# Patient Record
Sex: FEMALE | Race: WHITE
[De-identification: names, ages, dates, MRNs, and addresses within clinical notes are randomized per-mention and may not be internally consistent; named-entity substitution may affect disease eponyms.]

---

## 2021-08-17 ENCOUNTER — HOSPITAL ENCOUNTER (EMERGENCY)
Dept: HOSPITAL 46 - ED | Age: 81
Discharge: HOME | End: 2021-08-17
Payer: MEDICARE

## 2021-08-17 VITALS — WEIGHT: 130.01 LBS | HEIGHT: 68 IN | BODY MASS INDEX: 19.7 KG/M2

## 2021-08-17 DIAGNOSIS — I10: ICD-10-CM

## 2021-08-17 DIAGNOSIS — Z88.8: ICD-10-CM

## 2021-08-17 DIAGNOSIS — E11.9: ICD-10-CM

## 2021-08-17 DIAGNOSIS — T46.4X5A: ICD-10-CM

## 2021-08-17 DIAGNOSIS — Z79.899: ICD-10-CM

## 2021-08-17 DIAGNOSIS — T78.3XXA: Primary | ICD-10-CM

## 2021-08-17 DIAGNOSIS — E03.9: ICD-10-CM

## 2021-12-23 ENCOUNTER — HOSPITAL ENCOUNTER (EMERGENCY)
Dept: HOSPITAL 46 - ED | Age: 81
Discharge: HOME | End: 2021-12-23
Payer: MEDICARE

## 2021-12-23 VITALS — WEIGHT: 161 LBS | HEIGHT: 68 IN | BODY MASS INDEX: 24.4 KG/M2

## 2021-12-23 DIAGNOSIS — I10: ICD-10-CM

## 2021-12-23 DIAGNOSIS — R74.8: ICD-10-CM

## 2021-12-23 DIAGNOSIS — E03.9: ICD-10-CM

## 2021-12-23 DIAGNOSIS — K59.00: Primary | ICD-10-CM

## 2021-12-23 DIAGNOSIS — Z79.84: ICD-10-CM

## 2021-12-23 DIAGNOSIS — Z79.899: ICD-10-CM

## 2021-12-23 DIAGNOSIS — E11.9: ICD-10-CM

## 2021-12-23 DIAGNOSIS — Z88.8: ICD-10-CM

## 2023-05-24 ENCOUNTER — HOSPITAL ENCOUNTER (EMERGENCY)
Dept: HOSPITAL 46 - ED | Age: 83
Discharge: HOME | End: 2023-05-24
Payer: MEDICARE

## 2023-05-24 VITALS — WEIGHT: 161 LBS | BODY MASS INDEX: 24.4 KG/M2 | HEIGHT: 68 IN

## 2023-05-24 VITALS — DIASTOLIC BLOOD PRESSURE: 94 MMHG | SYSTOLIC BLOOD PRESSURE: 114 MMHG

## 2023-05-24 DIAGNOSIS — Z79.890: ICD-10-CM

## 2023-05-24 DIAGNOSIS — Z79.899: ICD-10-CM

## 2023-05-24 DIAGNOSIS — I10: ICD-10-CM

## 2023-05-24 DIAGNOSIS — E11.9: ICD-10-CM

## 2023-05-24 DIAGNOSIS — M48.54XA: Primary | ICD-10-CM

## 2023-05-24 DIAGNOSIS — E03.9: ICD-10-CM

## 2023-05-24 DIAGNOSIS — Z88.8: ICD-10-CM

## 2023-05-24 PROCEDURE — A9270 NON-COVERED ITEM OR SERVICE: HCPCS

## 2023-09-13 ENCOUNTER — HOSPITAL ENCOUNTER (OUTPATIENT)
Dept: HOSPITAL 46 - ED | Age: 83
Setting detail: OBSERVATION
LOS: 1 days | Discharge: HOME | End: 2023-09-14
Attending: STUDENT IN AN ORGANIZED HEALTH CARE EDUCATION/TRAINING PROGRAM | Admitting: STUDENT IN AN ORGANIZED HEALTH CARE EDUCATION/TRAINING PROGRAM
Payer: MEDICARE

## 2023-09-13 VITALS — SYSTOLIC BLOOD PRESSURE: 141 MMHG | DIASTOLIC BLOOD PRESSURE: 52 MMHG

## 2023-09-13 VITALS — DIASTOLIC BLOOD PRESSURE: 56 MMHG | SYSTOLIC BLOOD PRESSURE: 123 MMHG

## 2023-09-13 VITALS — SYSTOLIC BLOOD PRESSURE: 117 MMHG | DIASTOLIC BLOOD PRESSURE: 44 MMHG

## 2023-09-13 VITALS — DIASTOLIC BLOOD PRESSURE: 54 MMHG | SYSTOLIC BLOOD PRESSURE: 132 MMHG

## 2023-09-13 VITALS — DIASTOLIC BLOOD PRESSURE: 52 MMHG | SYSTOLIC BLOOD PRESSURE: 129 MMHG

## 2023-09-13 VITALS — WEIGHT: 155.43 LBS | BODY MASS INDEX: 23.56 KG/M2 | HEIGHT: 68 IN

## 2023-09-13 DIAGNOSIS — Z79.899: ICD-10-CM

## 2023-09-13 DIAGNOSIS — Z20.822: ICD-10-CM

## 2023-09-13 DIAGNOSIS — E11.9: ICD-10-CM

## 2023-09-13 DIAGNOSIS — E86.0: ICD-10-CM

## 2023-09-13 DIAGNOSIS — Z79.890: ICD-10-CM

## 2023-09-13 DIAGNOSIS — K75.3: ICD-10-CM

## 2023-09-13 DIAGNOSIS — E03.9: ICD-10-CM

## 2023-09-13 DIAGNOSIS — Z79.84: ICD-10-CM

## 2023-09-13 DIAGNOSIS — A41.9: Primary | ICD-10-CM

## 2023-09-13 DIAGNOSIS — Z88.8: ICD-10-CM

## 2023-09-13 DIAGNOSIS — I10: ICD-10-CM

## 2023-09-13 DIAGNOSIS — K52.9: ICD-10-CM

## 2023-09-13 DIAGNOSIS — R74.01: ICD-10-CM

## 2023-09-13 LAB
ALBUMIN SERPL-MCNC: 2.8 G/DL (ref 3.4–5)
ALBUMIN SERPL-MCNC: 3.6 G/DL (ref 3.4–5)
ALBUMIN/GLOB SERPL: 0.65 {RATIO} (ref 1.1–2.4)
ALBUMIN/GLOB SERPL: 0.71 {RATIO} (ref 1.1–2.4)
ALP SERPL-CCNC: 144 U/L (ref 46–116)
ALP SERPL-CCNC: 212 U/L (ref 46–116)
ALT SERPL W P-5'-P-CCNC: 376 U/L (ref 14–59)
ALT SERPL W P-5'-P-CCNC: 503 U/L (ref 14–59)
ANION GAP SERPL CALCULATED.4IONS-SCNC: 12.4 MMOL/L (ref 7–21)
ANION GAP SERPL CALCULATED.4IONS-SCNC: 18.9 MMOL/L (ref 7–21)
AST SERPL-CCNC: 412 U/L (ref 15–37)
AST SERPL-CCNC: 736 U/L (ref 15–37)
BASOPHILS NFR BLD AUTO: 0.1 % (ref 0–2)
BASOPHILS NFR BLD AUTO: 0.1 % (ref 0–2)
BUN SERPL-MCNC: 18 MG/DL (ref 7–18)
BUN SERPL-MCNC: 19 MG/DL (ref 7–18)
BUN/CREAT SERPL: 11.18 (ref 6–28.6)
BUN/CREAT SERPL: 13.57 (ref 6–28.6)
CALCIUM SERPL-MCNC: 10 MG/DL (ref 8.5–10.1)
CALCIUM SERPL-MCNC: 8.4 MG/DL (ref 8.5–10.1)
CHLORIDE SERPL-SCNC: 100 MMOL/L (ref 98–107)
CHLORIDE SERPL-SCNC: 95 MMOL/L (ref 98–107)
CO2 SERPL-SCNC: 23 MMOL/L (ref 21–32)
CO2 SERPL-SCNC: 26 MMOL/L (ref 21–32)
COCAINE, UR: NEGATIVE
DEPRECATED RDW RBC AUTO: 15.1 FL (ref 10.5–15)
DEPRECATED RDW RBC AUTO: 15.5 FL (ref 10.5–15)
EGFRCR SERPLBLD CKD-EPI 2021: 32 ML/MIN (ref 60–?)
EGFRCR SERPLBLD CKD-EPI 2021: 38 ML/MIN (ref 60–?)
EOSINOPHIL NFR BLD AUTO: 0.1 % (ref 0–6)
EOSINOPHIL NFR BLD AUTO: 0.2 % (ref 0–6)
FLUBV RNA RESP QL NAA+PROBE: NEGATIVE
GLOBULIN SER-MCNC: 4.3 G/DL (ref 1.8–3.5)
GLOBULIN SER-MCNC: 5.1 G/DL (ref 1.8–3.5)
HCT VFR BLD AUTO: 32.5 % (ref 35–50)
HCT VFR BLD AUTO: 42.4 % (ref 35–50)
HGB BLD-MCNC: 10.3 G/DL (ref 12–18)
HGB BLD-MCNC: 13.6 G/DL (ref 12–18)
HGB UR QL STRIP: NEGATIVE
KETONES UR QL STRIP: NEGATIVE
LACTATE SERPL-SCNC: 5 MMOL/L (ref 0.4–2)
LEUKOCYTE ESTERASE UR QL STRIP: NEGATIVE
LYMPHOCYTES NFR BLD AUTO: 7.4 % (ref 24–44)
LYMPHOCYTES NFR BLD AUTO: 9.9 % (ref 24–44)
MCH RBC QN AUTO: 28.3 PG (ref 27–36)
MCH RBC QN AUTO: 28.4 PG (ref 27–36)
MCHC RBC AUTO-ENTMCNC: 31.7 G/DL (ref 30–36)
MCHC RBC AUTO-ENTMCNC: 32.1 G/DL (ref 30–36)
MCV RBC AUTO: 88.3 FL (ref 81–99)
MCV RBC AUTO: 89.3 FL (ref 81–99)
METHADONE, UR: NEGATIVE
MONOCYTES NFR BLD AUTO: 2.3 % (ref 0–12)
MONOCYTES NFR BLD AUTO: 4.2 % (ref 0–12)
NEUTROPHILS NFR BLD AUTO: 85.7 % (ref 39–80)
NEUTROPHILS NFR BLD AUTO: 90 % (ref 39–80)
NITRITE UR QL STRIP: NEGATIVE
PLATELET # BLD AUTO: 205 K/UL (ref 140–440)
PLATELET # BLD AUTO: 233 K/UL (ref 140–440)
POTASSIUM SERPL-SCNC: 3.9 MMOL/L (ref 3.5–5.1)
POTASSIUM SERPL-SCNC: 4.4 MMOL/L (ref 3.5–5.1)
PROT SERPL-MCNC: 7.1 G/DL (ref 6.4–8.2)
PROT SERPL-MCNC: 8.7 G/DL (ref 6.4–8.2)
RBC # BLD AUTO: 3.64 M/UL (ref 4.3–5.7)
RBC # BLD AUTO: 4.8 M/UL (ref 4.3–5.7)
RSV RNA ISLT QL NAA+PROBE: NEGATIVE

## 2023-09-13 PROCEDURE — C9803 HOPD COVID-19 SPEC COLLECT: HCPCS

## 2023-09-13 PROCEDURE — G0008 ADMIN INFLUENZA VIRUS VAC: HCPCS

## 2023-09-13 PROCEDURE — G0378 HOSPITAL OBSERVATION PER HR: HCPCS

## 2023-09-13 PROCEDURE — U0002 COVID-19 LAB TEST NON-CDC: HCPCS

## 2023-09-13 PROCEDURE — A9270 NON-COVERED ITEM OR SERVICE: HCPCS

## 2023-09-13 NOTE — NUR
RECIEVED SHIFT REPORT. PT RESTING IN BED, EYES CLOSED. BREATHING EVEN AND
UNLABORED. CALL LIGHT IN REACH.

## 2023-09-13 NOTE — NUR
PT HAS DENIED PAIN AND NAUSEA TODAY. TOLERATED DIET WELL. SLEEPS INBETWEEN
MEALS. SON VISITED. IV CONTINUES TO INFUSE LR, SITE INTACT, NO REDNESS OR
SWELL. PT AMBULATES IN ROOM WITH SUPERVISION AND ASSIST WITH LINES. TOLERATES
ACTIVITY WELL. IV IN RAC REMAINS SL. PT SITTING UP EATING MEAL, REQUESTS
SOMETHING FOR "HEART BURN." MAALOX ORDERED VIA NIO PROTOCOL.

## 2023-09-13 NOTE — NUR
Report recdieved by tonja NÚÑEZ @ 1930. Ronit is resting in bed without
complaint, watching TV, call light in reach.

## 2023-09-13 NOTE — NUR
PT STATES SHE DOESNT FEEL THE NEED TO URINATE. MYCHAL SMITH BLADDER SCANNED PT,
514 ML NOTED. PT WALKED TO THE BATHROOM, SBA. LUIS IN ROOM TO ASSIST.

## 2023-09-13 NOTE — NUR
PT UNABLE TO VOID FOR URINE SAMPLE, DENIES URGE TO GO. BLADDER SCAN PERFORMED.
514 MLS NOTED IN BLADDER. PT ASSISTED TO TOILET TO ATTEMPT TO VOID, PT UNABLE
TO VOID. DR. OLSON CALLED AND NOTIFIED. DESIRED LAB ADDED ON TO URINE SAMPLE
ALREADY IN LAB FORM ED. NO NEW ORDERS AT THIS TIME.

## 2023-09-13 NOTE — NUR
IN ROOM W RN. PT UP TO CHAIR. SBA W MINIMAL ASSIST. PT SAT UP FOR MEAL. LINENS
CHANGED; NO NEEDS. PT DECLINED RESTROOM NEEDS. CALL LIGHT WITHIN REACH.

## 2023-09-13 NOTE — NUR
VERBAL ORDER FROM MD TO CHANGE PROTONIX DUPLICATE ORDER
TO BID, REPEATED BACK FOR VERIFICATION. EMAR UPDATED.

## 2023-09-13 NOTE — NUR
ADMISSION PROCESS COMPLETE, PT REQUESTED AND RECEIVED ICE TO UPPER BACK, WITH
SHEET OVER SKIN, THEN ICE VAHE.  PT ABLE TO SELF TURN FROM BACK TO RIGHT SIDE.
HAS NO NEEDS AT THIS TIME.  NS INFUSING PER ED ORDER, ONCE LITER COMPLETE WILL
CHANGE TO LR.  CALL LIGHT WITHIN REACH.  SON MARY ELLEN WENT HOME FOR THE NIGHT, THEY
LIVE TOGETHER IN AN APARTMENT.  ALL PERSONAL BELONGING SENT HOME WITH SON, HE
WILL BRING IN CLEAN CLOTHES FOR DISCHARGE.

## 2023-09-13 NOTE — NUR
ER RN SHONNA CALLED, THIS RN ASKED ABOUT REPEAT LACTIC AS LACTIC 5.0, LAB TO
BE DONE, WILL AWAIT TIL THOSE RESULTS ARE BACK TO ENSURE THEY ARE TRENDING
DOWN.

## 2023-09-13 NOTE — NUR
PATIENT AWAKE IN BED, VITALS AND I&OS CHARTED. BLADDER SCANNED PER RN
REQUEST, 514ML CHARTED. PATIENT INTO BR WITH SBA, NO VOID AFTER SEVERAL
MINUTES. RN AWARE. PATIENT STATES SHE DRINKS ONLY COFFE AT HOME AND VERY
LITTLE WATER. FRESH ICE WATER WITH SF CRYSTAL LITE PROVIDED. PATIENT BACK IN
BED. CALL LIGHT IN EASY REACH.

## 2023-09-13 NOTE — NUR
Ronit is in bed, she is alert and oriented, Temp 99.4, Abdomine slightly
rounded, nontender, +BT's, denies flatus, oriented to room, IV fluids
infusing, son at bedside for period of time prior to leaving for the night.
Tele on - apical rate 96 SR. call light in reach, Ronit is without
complaints at this time. Report recieved from ED RN.

## 2023-09-13 NOTE — NUR
PATIENT ALERT IN BED. STATES SHE LIVES IN AN APARTMENT WITH HER SON, MARY ELLEN.
THERE ARE 13 STEPS TO GET INSIDE HER APARTMENT AND SHE HAS NO ISSUES
NAVIGATING STEPS. SHE HAS A SHOWER CHAIR, BUT NO OTHER DME OR EQUIPMENT. SHE
IS STILL ABLE TO DRIVE, BUT MARY ELLEN PROVIDES TRANSPORTATION AT TIMES AS SHE DOES
NOT LIKE TO DRIVE OFTEN. SHE IS ABLE TO GET AROUND ROOM WITH SUPERVISION,
STAFF STATE HER GAIT IS STEADY AND SHE REQUIRES NO ASSISTANCE. PATIENT STATES
SHE IS NOT HAVING ANY FINANCIAL ISSUES, NO DIFFICULTY OBTAINING FOOD OR
MEDICATIONS. DENIES ANY NEEDS AT HOME AT THIS TIME. INSTRUCTED TO NOTIFY STAFF
IF SHE THINKS OF ANYTHING. VERBALIZE UNDERSTANDING

## 2023-09-14 VITALS — SYSTOLIC BLOOD PRESSURE: 145 MMHG | DIASTOLIC BLOOD PRESSURE: 56 MMHG

## 2023-09-14 VITALS — DIASTOLIC BLOOD PRESSURE: 59 MMHG | SYSTOLIC BLOOD PRESSURE: 148 MMHG

## 2023-09-14 VITALS — DIASTOLIC BLOOD PRESSURE: 55 MMHG | SYSTOLIC BLOOD PRESSURE: 131 MMHG

## 2023-09-14 VITALS — SYSTOLIC BLOOD PRESSURE: 163 MMHG | DIASTOLIC BLOOD PRESSURE: 70 MMHG

## 2023-09-14 LAB
ALBUMIN SERPL-MCNC: 2.7 G/DL (ref 3.4–5)
ALBUMIN/GLOB SERPL: 0.63 {RATIO} (ref 1.1–2.4)
ALP SERPL-CCNC: 132 U/L (ref 46–116)
ALT SERPL W P-5'-P-CCNC: 266 U/L (ref 14–59)
ANION GAP SERPL CALCULATED.4IONS-SCNC: 12 MMOL/L (ref 7–21)
AST SERPL-CCNC: 220 U/L (ref 15–37)
BASOPHILS NFR BLD AUTO: 0.5 % (ref 0–2)
BUN SERPL-MCNC: 14 MG/DL (ref 7–18)
BUN/CREAT SERPL: 12.72 (ref 6–28.6)
CALCIUM SERPL-MCNC: 8.7 MG/DL (ref 8.5–10.1)
CHLORIDE SERPL-SCNC: 102 MMOL/L (ref 98–107)
CO2 SERPL-SCNC: 24 MMOL/L (ref 21–32)
DEPRECATED RDW RBC AUTO: 15.1 FL (ref 10.5–15)
EGFRCR SERPLBLD CKD-EPI 2021: 50 ML/MIN (ref 60–?)
EOSINOPHIL NFR BLD AUTO: 1.7 % (ref 0–6)
GLOBULIN SER-MCNC: 4.3 G/DL (ref 1.8–3.5)
HCT VFR BLD AUTO: 31.8 % (ref 35–50)
HCV IGG SER IA-ACNC: 0.1 IV
HGB BLD-MCNC: 10.2 G/DL (ref 12–18)
LYMPHOCYTES NFR BLD AUTO: 18.1 % (ref 24–44)
MAGNESIUM SERPL-MCNC: 1.7 MG/DL (ref 1.8–2.4)
MCH RBC QN AUTO: 28.8 PG (ref 27–36)
MCHC RBC AUTO-ENTMCNC: 32.1 G/DL (ref 30–36)
MCV RBC AUTO: 89.5 FL (ref 81–99)
MONOCYTES NFR BLD AUTO: 4.9 % (ref 0–12)
NEUTROPHILS NFR BLD AUTO: 74.8 % (ref 39–80)
PLATELET # BLD AUTO: 174 K/UL (ref 140–440)
POTASSIUM SERPL-SCNC: 4 MMOL/L (ref 3.5–5.1)
PROT SERPL-MCNC: 7 G/DL (ref 6.4–8.2)
RBC # BLD AUTO: 3.56 M/UL (ref 4.3–5.7)

## 2023-09-14 NOTE — NUR
IN TO ADMINISTER MEDICATION, SEE MAR.
PT DENIES ANY OTHER NEEDS AT THIS TIME. CALL LIGHT IN REACH. BED ALARM ON.

## 2023-09-14 NOTE — NUR
PT COMPLAINED OF PAIN AND TOLD THE DR THAT IT WAS A CHRONIC ISSUE THAT SHE HAD
IN HER BACK. DR ORDERED TORADOL PT RECIEVEDPAIN MED. NO OTHER CARES ARE NEEDED
AT THIS TIME. CALL LIGHT WITHIN REACH

## 2023-09-14 NOTE — NUR
spoke to pharmacy about pt order of rocefin. since it was given last night
around 2100 and is scheduled for 0900 today pharmacist says he will change
schedule to 1400 and then continue administratiuon times for 0900. also
pharmacist says that if pt needs more pain meds that he will switch her to
motrin PO.

## 2023-09-14 NOTE — NUR
PT SITTING IN CHAIR.  STATES FEELING GOOD AND WOULD LIKE TO GO HOME.
RECOGNIZES MIGHT HAVE TO STAY ANOTHER NIGHT.  CONSENTED TO PRAYER.  PRAYED FOR
ONGOING HEALING AND CONTINUED BLESSING.

## 2023-09-14 NOTE — NUR
Ronit is up to BR with SBA of 1 person, griselda. well, Continues to have no
complaints, no nausea, no pain, VSS, voided 600ml. Is back to bed with call
light in reach and lights out.

## 2023-09-14 NOTE — NUR
REASSESSED PT PAIN AND PT STATES THAT SHE IS AT A 6. I ASKED IF SHE WANTED
SOMETHING ELSE AND SHE SAID NO ITS TOLERABLE.CALL LIGHT WITHIN REACH

## 2023-09-14 NOTE — NUR
RECIEVED SHIFT REPORT FROM NIGHT SHIFT NURSE. PT WAS AWAKE AND ASKED TO GO TO
THE REST ROOM. I ESCORTED PT TO THE RESTROOM. SHE HIT THE CALL LIGHT WHEN DONE
AND IS CURRENTLY SITTING UP IN HER CHAIR. NO OTHER CARES NEEDED AT THIS TIME.
CALL LIGHT WITHIN REACH

## 2023-09-14 NOTE — NUR
REPORT RECIEVED FROM WILTON JAUREGUI. PT REQUESTING TOILETING NEEDS. SBA FROM BED TO
RESTROOM. PT INFORMED TO USE PULL CORD ON WALL WHEN FINISHED. PT VERBALIZES
UNDERSTANDING.
MYCHAL WILCOX AWARE PT IN RESTROOM.

## 2023-09-14 NOTE — NUR
PATIENT SET UP IN HER CHAIR FOR BREAKFAST. AFTER BREAKFAST PATIENT WANTED TO
GO BACK TO BED SO CHERI PUT HER BACK TO BED. THAN SHE GOT UP AND WORKED WITH
PHYSICAL THERAPY. WHEN I SAW HER UP IN HER CHAIR I WENT IN AND DID HER MORNING
VITALS. THAN SHE WANTED TO GO BACK TO BED. PATIENT IS NOW SLEEPING.

## 2023-09-14 NOTE — NUR
pt call light answered. pt req to use br. pt up to br, indep. pt pulled br
call light when completed. pt back to bed. bed alarm set. call light within
reach.

## 2023-09-14 NOTE — NUR
PT CALL LIGHT ON. PT REQUESTS TO GET BACK TO BED. STAND BY ASSIST BACK TO BED.
PT ASSISTED WITH BLANKETS. PT ATE ~10% OF LUNCH. NO ADDITIONAL REQUESTS OR
COMPLAINTS. BED RAILS UP. BED ALARM ON. PTS PRIMARY RN UPDATED.

## 2023-09-14 NOTE — NUR
Patient has had an uneventful night, She has slept well, assisted up once to
the BR, Denies pain or nausea, VSS, Tele intact -SR. call light in reach and
patient continues to rest.

## 2023-09-15 LAB — REF LAB TEST RESULTS: (no result)

## 2025-02-16 ENCOUNTER — HOSPITAL ENCOUNTER (INPATIENT)
Dept: HOSPITAL 46 - ED | Age: 85
LOS: 1 days | Discharge: HOME | DRG: 69 | End: 2025-02-17
Attending: STUDENT IN AN ORGANIZED HEALTH CARE EDUCATION/TRAINING PROGRAM | Admitting: STUDENT IN AN ORGANIZED HEALTH CARE EDUCATION/TRAINING PROGRAM
Payer: MEDICARE

## 2025-02-16 VITALS — SYSTOLIC BLOOD PRESSURE: 144 MMHG | DIASTOLIC BLOOD PRESSURE: 58 MMHG

## 2025-02-16 VITALS — WEIGHT: 154.32 LBS | BODY MASS INDEX: 23.39 KG/M2 | HEIGHT: 68 IN

## 2025-02-16 VITALS — SYSTOLIC BLOOD PRESSURE: 161 MMHG | DIASTOLIC BLOOD PRESSURE: 66 MMHG

## 2025-02-16 VITALS — SYSTOLIC BLOOD PRESSURE: 125 MMHG | DIASTOLIC BLOOD PRESSURE: 94 MMHG

## 2025-02-16 DIAGNOSIS — Z79.890: ICD-10-CM

## 2025-02-16 DIAGNOSIS — K21.9: ICD-10-CM

## 2025-02-16 DIAGNOSIS — I65.01: ICD-10-CM

## 2025-02-16 DIAGNOSIS — R47.81: ICD-10-CM

## 2025-02-16 DIAGNOSIS — E03.9: ICD-10-CM

## 2025-02-16 DIAGNOSIS — F32.A: ICD-10-CM

## 2025-02-16 DIAGNOSIS — I25.10: ICD-10-CM

## 2025-02-16 DIAGNOSIS — F41.9: ICD-10-CM

## 2025-02-16 DIAGNOSIS — Z79.899: ICD-10-CM

## 2025-02-16 DIAGNOSIS — I66.02: ICD-10-CM

## 2025-02-16 DIAGNOSIS — G45.9: Primary | ICD-10-CM

## 2025-02-16 DIAGNOSIS — Z88.8: ICD-10-CM

## 2025-02-16 DIAGNOSIS — I10: ICD-10-CM

## 2025-02-16 DIAGNOSIS — E11.9: ICD-10-CM

## 2025-02-16 DIAGNOSIS — Z79.84: ICD-10-CM

## 2025-02-16 LAB
ALBUMIN SERPL-MCNC: 2.9 G/DL (ref 3.4–5)
ALBUMIN/GLOB SERPL: 0.64 {RATIO} (ref 1.1–2.4)
ALP SERPL-CCNC: 104 U/L (ref 46–116)
ALT SERPL W P-5'-P-CCNC: 46 U/L (ref 14–59)
ANION GAP SERPL CALCULATED.4IONS-SCNC: 12 MMOL/L (ref 7–21)
AST SERPL-CCNC: 44 U/L (ref 15–37)
BASOPHILS NFR BLD AUTO: 0.6 % (ref 0–2)
BUN SERPL-MCNC: 20 MG/DL (ref 7–18)
BUN/CREAT SERPL: 17.09 (ref 6–28.6)
CALCIUM SERPL-MCNC: 9.1 MG/DL (ref 8.5–10.1)
CHLORIDE SERPL-SCNC: 98 MMOL/L (ref 98–107)
CHOLEST SERPL-MCNC: 160 MG/DL (ref ?–200)
CHOLEST/HDLC SERPL: 3.6 {RATIO}
CO2 SERPL-SCNC: 27 MMOL/L (ref 21–32)
DEPRECATED RDW RBC AUTO: 14.2 FL (ref 10.5–15)
EGFRCR SERPLBLD CKD-EPI 2021: 46 ML/MIN (ref 60–?)
EOSINOPHIL NFR BLD AUTO: 2 % (ref 0–6)
GLOBULIN SER-MCNC: 4.5 G/DL (ref 1.8–3.5)
HCT VFR BLD AUTO: 33.9 % (ref 35–50)
HDLC SERPL-MCNC: 44 MG/DL (ref 40–60)
HGB BLD-MCNC: 10.9 G/DL (ref 12–18)
HGB UR QL STRIP: NEGATIVE
KETONES UR QL STRIP: NEGATIVE
LDLC SERPL CALC-MCNC: 88 MG/DL (ref ?–129)
LEUKOCYTE ESTERASE UR QL STRIP: NEGATIVE
LYMPHOCYTES NFR BLD AUTO: 37.9 % (ref 24–44)
MCH RBC QN AUTO: 27.6 PG (ref 27–36)
MCHC RBC AUTO-ENTMCNC: 32 G/DL (ref 30–36)
MCV RBC AUTO: 86.1 FL (ref 81–99)
MONOCYTES NFR BLD AUTO: 6.4 % (ref 0–12)
NEUTROPHILS NFR BLD AUTO: 53.1 % (ref 39–80)
NITRITE UR QL STRIP: NEGATIVE
NONHDLC SERPL-MCNC: 116 MG/DL
PLATELET # BLD AUTO: 246 K/UL (ref 140–440)
POTASSIUM SERPL-SCNC: 4 MMOL/L (ref 3.5–5.1)
PROT SERPL-MCNC: 7.4 G/DL (ref 6.4–8.2)
RBC # BLD AUTO: 3.94 M/UL (ref 4.3–5.7)
TRIGL SERPL-MCNC: 141 NG/DL (ref ?–150)
TSH SERPL DL<=0.005 MIU/L-ACNC: 3.09 UIU/ML (ref 0.36–3.74)
VLDLC SERPL CALC-MCNC: 28 MG/DL

## 2025-02-16 PROCEDURE — A9270 NON-COVERED ITEM OR SERVICE: HCPCS

## 2025-02-16 NOTE — NUR
PT ARRIVES TO MED-SURG AT 1325 VIA STRETCHER. PT TRANSFERRED TO BED VIA DRAW
SHEET. PT TOLERATES THIS WELL. VSS. PT WT OBTAINED VIA BED. PT IS ALERT AND
ORIENTED ON RA. PT ORIENTED TO ROOM AND CALL LIGHT. VERBAL REPORT RECEIVED
FROM GIO HILLS RN.
 
PT RESTS IN BED, WATCHED TV, CALL LIGHT IN REACH. SON, "MARY ELLEN" AT BEDSIDE. PT
EATS MASHED POTATOES AND GRAVY AS REQUESTED. NO REQUESTS AT THIS TIME. PT
INSTRUCTED TO USE CALL FOR NEEDS AND NOT TO GET UP WITHOUT HELP. BED IN LOW
POSITION, BED LOCK, BED ALARM ON.

## 2025-02-16 NOTE — NUR
Patient awake, alert and oriented x3. Patient's speech is clear and
appropriate. Patient denies physical deficits,  are equal/strong in
upper/lower exremities. Vital signs stable, pt on room air. Patient has a
consistant weak cough, patent airway. Bed alarm intact, pt instructed to call
if she has needs.

## 2025-02-16 NOTE — NUR
PT RESTS IN BED AWAKE AND ALERT, WATCH TV, SON - MARY ELLEN AT BEDSIDE. CALL LIGHT IN
REACH. PT DENIES ANY NEEDS AT THIS TIME.

## 2025-02-16 NOTE — NUR
PT REPORTS DIFFICULTY EATING DINNER, MYCHAL BETANCUR REPORTS PT WAS COUGHING WHEN
ATTEMPTING TO EACH DINNER. PT HAS HAD A PERSISTENT COUGH SINCE ARRIVING TO THE
UNIT. DR. LIGHT NOTIFIED. NEW ORDERS RECEIVED FOR PT TO BE NPO AND SWALLOW
EVAL TO FOLLOW.

## 2025-02-17 VITALS — SYSTOLIC BLOOD PRESSURE: 111 MMHG | DIASTOLIC BLOOD PRESSURE: 49 MMHG

## 2025-02-17 VITALS — DIASTOLIC BLOOD PRESSURE: 56 MMHG | SYSTOLIC BLOOD PRESSURE: 167 MMHG

## 2025-02-17 VITALS — SYSTOLIC BLOOD PRESSURE: 143 MMHG | DIASTOLIC BLOOD PRESSURE: 51 MMHG

## 2025-02-17 LAB
ANION GAP SERPL CALCULATED.4IONS-SCNC: 12.8 MMOL/L (ref 7–21)
BASOPHILS NFR BLD AUTO: 0.6 % (ref 0–2)
BUN SERPL-MCNC: 16 MG/DL (ref 7–18)
BUN/CREAT SERPL: 14.54 (ref 6–28.6)
CALCIUM SERPL-MCNC: 9.9 MG/DL (ref 8.5–10.1)
CHLORIDE SERPL-SCNC: 100 MMOL/L (ref 98–107)
CO2 SERPL-SCNC: 29 MMOL/L (ref 21–32)
DEPRECATED RDW RBC AUTO: 14 FL (ref 10.5–15)
EGFRCR SERPLBLD CKD-EPI 2021: 50 ML/MIN (ref 60–?)
EOSINOPHIL NFR BLD AUTO: 3.4 % (ref 0–6)
HCT VFR BLD AUTO: 33.7 % (ref 35–50)
HGB BLD-MCNC: 11.2 G/DL (ref 12–18)
INR PPP: 0.99 (ref 0.8–1.3)
LYMPHOCYTES NFR BLD AUTO: 43.1 % (ref 24–44)
MCH RBC QN AUTO: 28 PG (ref 27–36)
MCHC RBC AUTO-ENTMCNC: 33.2 G/DL (ref 30–36)
MCV RBC AUTO: 84.4 FL (ref 81–99)
MONOCYTES NFR BLD AUTO: 7.8 % (ref 0–12)
NEUTROPHILS NFR BLD AUTO: 45.1 % (ref 39–80)
PLATELET # BLD AUTO: 246 K/UL (ref 140–440)
POTASSIUM SERPL-SCNC: 4.8 MMOL/L (ref 3.5–5.1)
PROTHROMBIN TIME: 13 SEC (ref 11.2–14.2)
RBC # BLD AUTO: 3.99 M/UL (ref 4.3–5.7)

## 2025-02-17 NOTE — NUR
DISCUSSED PERSISTENT COUGH WITH PT AND SON - MARY ELLEN, WHO REPORTS THAT SHE HAS HAD
THE COUGH NOW FOR SEVERAL MONTHS AND THAT SHE ALSO DOES NOT USE HER DENTURES
AT HOME AS THE BOTTOMS ARE BROKEN TOPS DO NOT FIT. DR. LIGHT NOTIFIED.

## 2025-02-17 NOTE — NUR
VERBAL REPORT RECEIVED FROM JONATHAN BRADLEY RN. PT RESTS IN BED WITH EYES CLOSED,
RESP EVEN AND UNLABORED.

## 2025-02-17 NOTE — NUR
PT REQUESTS TO EAT. DR. LIGHT NOTIFIED, NEW ORDER RECEIVED FOR PUREE TEXTURE
DIET AND THICKENED LIQUIDS UNITL SWALLOW EVAL.

## 2025-02-17 NOTE — NUR
UR CLINICAL REVIEW:
2 MN FOR VERSALUS- PER THIS
RN REVIEW STAY BETTER
SUITED FOR OBS VS INPT DUE
RESOLVING OF NEURO DEFICITS
AND NIH LESS THAN 2
MEDICARE
INPT 02/16/25 @ 1301
ORDER MATCHES REG
NO AUTH REQUIRED PER
MEDICARE GUIDELINES
DISCHARGE TO HOME TODAY

## 2025-02-17 NOTE — NUR
Spoke with Ronit.  She states she lives in an apartment with her 64 yo son.
She has 0 steps to get into her apartment. Pt has a shower chair, but 0 other
DME. Pt states she walks without problem and does not require any assistive
devices. She denies any financial issues and does not need or want food
stamps. Discussed PT is recommending HH and pt declines as she does not feel
she needs it. She states her speach has returned to normal for her and she
walks without problems. Pt plans on dc to home today. Dr. Hendricks updated.

## 2025-02-17 NOTE — NUR
PATIENT GIVEN VISTARIL FOR ANXIETY PRIOR TO MRI.  PATIENT DID SWALLOW PILL
FINE, COUGHED WITH THE WATER.  PATIENT TO MRI VIA WHEELCHAIR WITH CESAR.

## 2025-02-17 NOTE — NUR
Patient up to restroom to void, 700ml clear yellow urine noted. Patient back
to bed, alarm intact. Patient denies needs, call light within reach.

## 2025-02-17 NOTE — NUR
PATIENT CALLED TO USE RESTROOM. UP TO BATHROOM AND BACK TO BED, SBA. PATIENT
REFUSED TO GO TO CHAIR AT THIS TIME. AM CARE REFUSED. CALL LIGHT IN REACH. BED
ALARM ON. NO FURTHER NEEDS AT THIS TIME.

## 2025-02-17 NOTE — NUR
DISCUSSED DISCHARGE INSTRUCTIONS WITH PT AND SON, BOTH VERBALIZE
UNDERSTANDING. VSS. IV REMOVED, TIP INTACT, GAUZE AND COBAN DRESSING APPLIED
TO SITE, PT TOLERATED WELL. PT DRESSES SELF. PT ESCORTED VIA WHEELCHAIR BY
WILTON VALENCIA TO PRIVATE CAR DRIVEN BY SONREYNALDO. PT LEAVES UNIT IN NO APPARENT
DISTRESS.